# Patient Record
(demographics unavailable — no encounter records)

---

## 2024-11-13 NOTE — DATA REVIEWED
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

## 2024-11-13 NOTE — PHYSICAL EXAM
[de-identified] : ear normal but unable to pop ear under scope AS [Normal] : no abnormal secretions

## 2024-11-13 NOTE — HISTORY OF PRESENT ILLNESS
[de-identified] : left ear clogged with tinnitus x 3 weeks - tried zpack no benefit - does not feel related to URI - denies spinning vertigo or headaches

## 2025-03-15 NOTE — PROCEDURE
[Topical Lidocaine] : topical lidocaine [Oxymetazoline HCl] : oxymetazoline HCl [Flexible Endoscope] : examined with the flexible endoscope [Normal] : the nasopharynx was normal [Adenoids Present] : the adenoids were absent [Obstructing Posterior Choanae] : the adenoids did not obstruct the posterior choanae [Mass ___ cm] : no masses [de-identified] : Tisha [de-identified] : otitis media

## 2025-03-15 NOTE — PHYSICAL EXAM
[de-identified] : OME AS; AD normal  [Normal] : mucosa is normal [Midline] : trachea located in midline position

## 2025-03-15 NOTE — HISTORY OF PRESENT ILLNESS
[de-identified] : 77 yo F with hx of OME now with possible OME AU presents for further evaluation. Started Amoxicillin on Sun/Mon for a 1-2 days and then switched to Augmentin. Did not start Cefdinir yet. Left with more otalgia and worse hearing AS - but better than yesterday. No otorrhea or vertigo.

## 2025-03-15 NOTE — PROCEDURE
[Topical Lidocaine] : topical lidocaine [Oxymetazoline HCl] : oxymetazoline HCl [Flexible Endoscope] : examined with the flexible endoscope [Normal] : the nasopharynx was normal [Adenoids Present] : the adenoids were absent [Obstructing Posterior Choanae] : the adenoids did not obstruct the posterior choanae [Mass ___ cm] : no masses [de-identified] : Tisha [de-identified] : otitis media

## 2025-03-15 NOTE — HISTORY OF PRESENT ILLNESS
[de-identified] : 77 yo F with hx of OME now with possible OME AU presents for further evaluation. Started Amoxicillin on Sun/Mon for a 1-2 days and then switched to Augmentin. Did not start Cefdinir yet. Left with more otalgia and worse hearing AS - but better than yesterday. No otorrhea or vertigo.

## 2025-03-15 NOTE — PHYSICAL EXAM
[de-identified] : OME AS; AD normal  [Normal] : mucosa is normal [Midline] : trachea located in midline position

## 2025-03-31 NOTE — DATA REVIEWED
[de-identified] : Right: Hearing WNL sloping to a mild to severe SNHL. Type A tymp Left: Mild to profound mixed HL. Type C tymp

## 2025-03-31 NOTE — HISTORY OF PRESENT ILLNESS
[de-identified] : 77 yo F with mixed loss AS and OME presents for follow up. Completed omnicef and medrol. Continues to use Flonase. Hearing improved but occasional clogged sensation AS. Intermittent tinnitus AS. More frequent vertigo. Has vertigo a few times a day lasting a few seconds. No otalgia or otorrhea.

## 2025-04-11 NOTE — HISTORY OF PRESENT ILLNESS
[de-identified] : 76 year old female presents for clogged ears, nasal congestion  Internal referral by Dr Giles- recurrent otitis  Ongoing clogged left ear-feels underwater  Denies otalgia, otorrhea  Longstanding history of intermittent nasal congestion L>R Occasional mild clear anterior rhinorrhea and PND Occasional sinus headaches Denies epistaxis  Sense of smell is good  Using Flonase daily with temporary relief  CT Sinuses 4/10/25 Impression :Mucosal thickening of the paranasal sinuses most notably involving the left sphenoid sinus. Nasal septal deviation to the left with narrowing of the left nasal cavity. Narrowing/obstruction of sinus drainage pathways.

## 2025-04-11 NOTE — REASON FOR VISIT
[Initial Evaluation] : an initial evaluation for [FreeTextEntry2] : internal referral by Dr Giles for recurrent otitis media, nasal congestion

## 2025-04-11 NOTE — HISTORY OF PRESENT ILLNESS
[de-identified] : 76 year old female presents for clogged ears, nasal congestion  Internal referral by Dr Giles- recurrent otitis  Ongoing clogged left ear-feels underwater  Denies otalgia, otorrhea  Longstanding history of intermittent nasal congestion L>R Occasional mild clear anterior rhinorrhea and PND Occasional sinus headaches Denies epistaxis  Sense of smell is good  Using Flonase daily with temporary relief  CT Sinuses 4/10/25 Impression :Mucosal thickening of the paranasal sinuses most notably involving the left sphenoid sinus. Nasal septal deviation to the left with narrowing of the left nasal cavity. Narrowing/obstruction of sinus drainage pathways.